# Patient Record
Sex: FEMALE | Race: WHITE | NOT HISPANIC OR LATINO | Employment: FULL TIME | URBAN - NONMETROPOLITAN AREA
[De-identification: names, ages, dates, MRNs, and addresses within clinical notes are randomized per-mention and may not be internally consistent; named-entity substitution may affect disease eponyms.]

---
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
## 2024-10-08 ENCOUNTER — OFFICE VISIT (OUTPATIENT)
Dept: FAMILY MEDICINE | Facility: OTHER | Age: 19
End: 2024-10-08
Attending: NURSE PRACTITIONER
Payer: COMMERCIAL

## 2024-10-08 VITALS
TEMPERATURE: 98.2 F | OXYGEN SATURATION: 98 % | HEART RATE: 82 BPM | RESPIRATION RATE: 18 BRPM | DIASTOLIC BLOOD PRESSURE: 74 MMHG | HEIGHT: 63 IN | SYSTOLIC BLOOD PRESSURE: 124 MMHG | WEIGHT: 147.8 LBS | BODY MASS INDEX: 26.19 KG/M2

## 2024-10-08 DIAGNOSIS — J02.9 SORE THROAT: ICD-10-CM

## 2024-10-08 DIAGNOSIS — U07.1 INFECTION DUE TO 2019 NOVEL CORONAVIRUS: Primary | ICD-10-CM

## 2024-10-08 LAB
FLUAV RNA SPEC QL NAA+PROBE: NEGATIVE
FLUBV RNA RESP QL NAA+PROBE: NEGATIVE
GROUP A STREP BY PCR: NOT DETECTED
RSV RNA SPEC NAA+PROBE: NEGATIVE
SARS-COV-2 RNA RESP QL NAA+PROBE: POSITIVE

## 2024-10-08 PROCEDURE — G0463 HOSPITAL OUTPT CLINIC VISIT: HCPCS

## 2024-10-08 PROCEDURE — 87637 SARSCOV2&INF A&B&RSV AMP PRB: CPT | Mod: ZL | Performed by: NURSE PRACTITIONER

## 2024-10-08 PROCEDURE — 99213 OFFICE O/P EST LOW 20 MIN: CPT | Performed by: NURSE PRACTITIONER

## 2024-10-08 PROCEDURE — 87651 STREP A DNA AMP PROBE: CPT | Mod: ZL | Performed by: NURSE PRACTITIONER

## 2024-10-08 RX ORDER — NORETHINDRONE AND ETHINYL ESTRADIOL 1 MG-35MCG
1 KIT ORAL DAILY
COMMUNITY

## 2024-10-08 ASSESSMENT — ENCOUNTER SYMPTOMS: SORE THROAT: 1

## 2024-10-08 ASSESSMENT — PAIN SCALES - GENERAL: PAINLEVEL: SEVERE PAIN (7)

## 2024-10-08 NOTE — LETTER
October 8, 2024      Ni Castro  [address] [address]        To Whom It May Concern:    Ni Castro  was seen on 10/8/2024.  Please excuse her  until Saturday, September 12, 2024.  She tested positive for COVID on October 8, 2024, symptoms started on October 6, 2024, should be in quarantine for 5 days.          Sincerely,        MIGEL Carrillo CNP

## 2024-10-08 NOTE — PROGRESS NOTES
"  Assessment & Plan   Problem List Items Addressed This Visit    None  Visit Diagnoses       Infection due to 2019 novel coronavirus    -  Primary    Sore throat        Relevant Orders    Group A Streptococcus PCR Throat Swab (Completed)    Symptomatic Influenza A/B, RSV, & SARS-CoV2 PCR (COVID-19) Nose (Completed)           Sore throat, likely viral in nature.  Strep test and COVID multiplex obtained.  Strep test was negative, COVID test was positive.  Call placed to let her know.  If she is interested in antiviral treatment, I can send in the prescription.  Recommend isolation for 5 days, continue symptomatic management.     No follow-ups on file.      Maria Elena Dan is a 19 year old, presenting for the following health issues:  Pharyngitis        10/8/2024     1:38 PM   Additional Questions   Roomed by Maureen Benitez LPN   Accompanied by Aunt         10/8/2024     1:38 PM   Patient Reported Additional Medications   Patient reports taking the following new medications N/A     History of Present Illness       Reason for visit:  Sick  Symptom onset:  1-3 days ago  Symptoms include:  Coughing fever  Symptom intensity:  Moderate  Symptom progression:  Worsening  Had these symptoms before:  No  What makes it worse:  No  What makes it better:  No   She is taking medications regularly.     She presents today with concerns about sore throat.  She reports Friday morning she was not feeling well, Sunday she started having sore throat, headache and pain to swallow.  She feels hot when she is sleeping, fevers.  Has had a cough when she is sleeping.  Has been using over-the-counter medications including ibuprofen, DayQuil, NyQuil.  No known contacts but does work as a  on night shift.          Objective    /74 (BP Location: Right arm, Patient Position: Sitting)   Pulse 82   Temp 98.2  F (36.8  C) (Tympanic)   Resp 18   Ht 1.6 m (5' 3\")   Wt 67 kg (147 lb 12.8 oz)   LMP 10/01/2024 (Approximate)   SpO2 98%  "  BMI 26.18 kg/m    Body mass index is 26.18 kg/m .  Physical Exam   GENERAL: alert and no distress  EYES: Eyes grossly normal to inspection, PERRL and conjunctivae and sclerae normal  HENT: ear canals and TM's normal, nose and mouth without ulcers or lesions  NECK: no adenopathy, no asymmetry, masses, or scars  RESP: lungs clear to auscultation - no rales, rhonchi or wheezes  CV: regular rate and rhythm, normal S1 S2  NEURO: Normal strength and tone, mentation intact and speech normal  PSYCH: mentation appears normal, affect normal/bright            Signed Electronically by: MIGEL Carrillo CNP

## 2024-10-08 NOTE — NURSING NOTE
"Chief Complaint   Patient presents with    Pharyngitis       Initial /74 (BP Location: Right arm, Patient Position: Sitting)   Pulse 82   Temp 98.2  F (36.8  C) (Tympanic)   Resp 18   Ht 1.6 m (5' 3\")   Wt 67 kg (147 lb 12.8 oz)   LMP 10/01/2024 (Approximate)   SpO2 98%   BMI 26.18 kg/m   Estimated body mass index is 26.18 kg/m  as calculated from the following:    Height as of this encounter: 1.6 m (5' 3\").    Weight as of this encounter: 67 kg (147 lb 12.8 oz).  Medication Review: complete    The next two questions are to help us understand your food security.  If you are feeling you need any assistance in this area, we have resources available to support you today.           No data to display                  Health Care Directive:  Patient does not have a Health Care Directive or Living Will: Discussed advance care planning with patient; however, patient declined at this time.    Maureen Benitez LPN      "

## (undated) RX ORDER — PREDNISONE 20 MG/1
TABLET ORAL
Status: DISPENSED
Start: 2018-10-22

## (undated) RX ORDER — IBUPROFEN 400 MG/1
TABLET, FILM COATED ORAL
Status: DISPENSED
Start: 2018-10-22